# Patient Record
Sex: MALE | Employment: FULL TIME | URBAN - METROPOLITAN AREA
[De-identification: names, ages, dates, MRNs, and addresses within clinical notes are randomized per-mention and may not be internally consistent; named-entity substitution may affect disease eponyms.]

---

## 2024-04-19 ENCOUNTER — OFFICE VISIT (OUTPATIENT)
Dept: SURGERY | Age: 29
End: 2024-04-19
Payer: COMMERCIAL

## 2024-04-19 VITALS — SYSTOLIC BLOOD PRESSURE: 123 MMHG | WEIGHT: 151.5 LBS | HEART RATE: 98 BPM | DIASTOLIC BLOOD PRESSURE: 83 MMHG

## 2024-04-19 DIAGNOSIS — L05.91 PILONIDAL CYST: Primary | ICD-10-CM

## 2024-04-19 PROCEDURE — 99202 OFFICE O/P NEW SF 15 MIN: CPT | Performed by: SURGERY

## 2024-04-19 RX ORDER — HYDROXYZINE HYDROCHLORIDE 25 MG/1
25 TABLET, FILM COATED ORAL DAILY PRN
COMMUNITY
Start: 2023-01-12

## 2024-04-19 RX ORDER — METHOCARBAMOL 750 MG/1
750 TABLET, FILM COATED ORAL 2 TIMES DAILY PRN
COMMUNITY
Start: 2019-08-02

## 2024-04-19 RX ORDER — DOXYCYCLINE HYCLATE 100 MG/1
CAPSULE ORAL
COMMUNITY
Start: 2024-04-09

## 2024-04-19 NOTE — PROGRESS NOTES
Sachin Gudino MD   General and Robotic surgery  [address] [facility] [address], [address]  [place]  [address]  Phone [phone]   Fax [phone]      Date of visit: 2024      Primary/Requesting provider: None, None         Name: Akbar Eller      MRN: [id number]       : 1995       Age: 28 y.o.    Sex: male        PCP: None, None     CC:    Chief Complaint   Patient presents with    New Patient     NP - Pilonidal cyst       HPI:     Akbar Eller is a 28 y.o. male with a recent history of incision and drainage of what was perceived to be a pilonidal cyst.  He apparently had this happen approximately 1 year ago as well.  As of today on exam I can find no evidence of residual cyst.  Not even thickening overlying the midline.  Since this has been drained and he has no evidence of infection, I have recommended not pursuing resection unless it reappears.  If there is no other sign and symptom of this cyst in the future then resection would be unnecessary.  I have instructed the patient to contact our office if he feels a fullness or an area that is reaccumulating to reassess.  He is in agreement with plan and will remain.      History reviewed. No pertinent past medical history.     Past Surgical History:   Procedure Laterality Date    NECK SURGERY              Current Outpatient Medications   Medication Sig Dispense Refill    doxycycline hyclate (VIBRAMYCIN) 100 MG capsule TAKE 1 CAPSULE BY MOUTH TWICE DAILY FOR 7 DAYS      hydrOXYzine HCl (ATARAX) 25 MG tablet Take 1 tablet by mouth daily as needed      methocarbamol (ROBAXIN) 750 MG tablet Take 1 tablet by mouth 2 times daily as needed       No current facility-administered medications for this visit.        Allergies   Allergen Reactions    Ibuprofen Shortness Of Breath and Swelling    Proanthocyanidin Shortness Of Breath    Adhesive Tape Rash     Plastic EKG pads caused burning to skin, marks lasted 2 weeks        Social History

## 2024-09-26 ENCOUNTER — OFFICE VISIT (OUTPATIENT)
Dept: SURGERY | Age: 29
End: 2024-09-26
Payer: COMMERCIAL

## 2024-09-26 VITALS
SYSTOLIC BLOOD PRESSURE: 118 MMHG | WEIGHT: 153 LBS | BODY MASS INDEX: 25.49 KG/M2 | HEART RATE: 88 BPM | DIASTOLIC BLOOD PRESSURE: 69 MMHG | HEIGHT: 65 IN

## 2024-09-26 DIAGNOSIS — L05.91 PILONIDAL CYST WITHOUT ABSCESS: Primary | ICD-10-CM

## 2024-09-26 PROCEDURE — G8419 CALC BMI OUT NRM PARAM NOF/U: HCPCS | Performed by: SURGERY

## 2024-09-26 PROCEDURE — G8427 DOCREV CUR MEDS BY ELIG CLIN: HCPCS | Performed by: SURGERY

## 2024-09-26 PROCEDURE — 4004F PT TOBACCO SCREEN RCVD TLK: CPT | Performed by: SURGERY

## 2024-09-26 PROCEDURE — 99213 OFFICE O/P EST LOW 20 MIN: CPT | Performed by: SURGERY

## 2024-10-11 ENCOUNTER — OFFICE VISIT (OUTPATIENT)
Dept: SURGERY | Age: 29
End: 2024-10-11

## 2024-10-11 VITALS
BODY MASS INDEX: 25.87 KG/M2 | WEIGHT: 155.3 LBS | HEART RATE: 73 BPM | HEIGHT: 65 IN | SYSTOLIC BLOOD PRESSURE: 128 MMHG | DIASTOLIC BLOOD PRESSURE: 79 MMHG

## 2024-10-11 DIAGNOSIS — L05.91 PILONIDAL CYST WITHOUT ABSCESS: Primary | ICD-10-CM

## 2024-10-31 NOTE — PROGRESS NOTES
Sachin Gudino MD   General and Robotic surgery  [address], [address]  [place] [address]  Phone [phone]   Fax [phone]      Date of visit: 10/31/2024      Primary/Requesting provider: None, None         Name: Akbar Eller      MRN: [id number]       : 1995       Age: 29 y.o.    Sex: male        PCP: None, None     CC:    Chief Complaint   Patient presents with    Follow-up     Pilonidal cyst         HPI:     Akbar Eller is a 29 y.o. male with a history of pilonidal cyst who would like to discuss removal of the cyst.  It is not currently obvious on exam although he says it certainly is more so when he is actively infected.  He is on antibiotics from a recent episode.  It has never been incised and drained previously.  Will see him back in 2 weeks to reevaluate.      History reviewed. No pertinent past medical history.     Past Surgical History:   Procedure Laterality Date    NECK SURGERY              Current Outpatient Medications   Medication Sig Dispense Refill    doxycycline hyclate (VIBRAMYCIN) 100 MG capsule TAKE 1 CAPSULE BY MOUTH TWICE DAILY FOR 7 DAYS      hydrOXYzine HCl (ATARAX) 25 MG tablet Take 1 tablet by mouth daily as needed      methocarbamol (ROBAXIN) 750 MG tablet Take 1 tablet by mouth 2 times daily as needed       No current facility-administered medications for this visit.        Allergies   Allergen Reactions    Ibuprofen Shortness Of Breath and Swelling    Proanthocyanidin Shortness Of Breath    Adhesive Tape Rash     Plastic EKG pads caused burning to skin, marks lasted 2 weeks        Social History       Tobacco History       Smoking Status  Every Day Smoking Tobacco Type  Cigarettes      Smokeless Tobacco Use  Never              Alcohol History       Alcohol Use Status  Not Asked              Drug Use       Drug Use Status  Not Asked              Sexual Activity       Sexually Active  Not Asked                     History reviewed. No

## 2025-07-25 ENCOUNTER — OFFICE VISIT (OUTPATIENT)
Dept: SURGERY | Age: 30
End: 2025-07-25
Payer: COMMERCIAL

## 2025-07-25 VITALS
HEART RATE: 78 BPM | HEIGHT: 69 IN | SYSTOLIC BLOOD PRESSURE: 118 MMHG | WEIGHT: 151 LBS | BODY MASS INDEX: 22.36 KG/M2 | DIASTOLIC BLOOD PRESSURE: 84 MMHG

## 2025-07-25 DIAGNOSIS — L05.91 PILONIDAL CYST: Primary | ICD-10-CM

## 2025-07-25 PROCEDURE — G8427 DOCREV CUR MEDS BY ELIG CLIN: HCPCS | Performed by: SURGERY

## 2025-07-25 PROCEDURE — 4004F PT TOBACCO SCREEN RCVD TLK: CPT | Performed by: SURGERY

## 2025-07-25 PROCEDURE — G8420 CALC BMI NORM PARAMETERS: HCPCS | Performed by: SURGERY

## 2025-07-25 PROCEDURE — 99213 OFFICE O/P EST LOW 20 MIN: CPT | Performed by: SURGERY

## 2025-08-08 ENCOUNTER — OFFICE VISIT (OUTPATIENT)
Dept: SURGERY | Age: 30
End: 2025-08-08

## 2025-08-08 DIAGNOSIS — L05.91 PILONIDAL CYST WITHOUT ABSCESS: Primary | ICD-10-CM

## 2025-08-08 PROCEDURE — 99213 OFFICE O/P EST LOW 20 MIN: CPT | Performed by: SURGERY

## 2025-08-08 RX ORDER — DOXYCYCLINE HYCLATE 100 MG
100 TABLET ORAL 2 TIMES DAILY
Qty: 28 TABLET | Refills: 0 | Status: SHIPPED | OUTPATIENT
Start: 2025-08-08 | End: 2025-08-22

## 2025-08-08 RX ORDER — DOXYCYCLINE HYCLATE 100 MG
100 TABLET ORAL 2 TIMES DAILY
Qty: 14 TABLET | Refills: 0 | Status: CANCELLED | OUTPATIENT
Start: 2025-08-08 | End: 2025-08-15